# Patient Record
Sex: MALE | Race: WHITE
[De-identification: names, ages, dates, MRNs, and addresses within clinical notes are randomized per-mention and may not be internally consistent; named-entity substitution may affect disease eponyms.]

---

## 2018-04-08 ENCOUNTER — HOSPITAL ENCOUNTER (EMERGENCY)
Dept: HOSPITAL 92 - SCSER | Age: 78
Discharge: HOME | End: 2018-04-08
Payer: MEDICARE

## 2018-04-08 PROCEDURE — 96372 THER/PROPH/DIAG INJ SC/IM: CPT

## 2018-09-14 ENCOUNTER — HOSPITAL ENCOUNTER (OUTPATIENT)
Dept: HOSPITAL 92 - TBSIIMAG | Age: 78
Discharge: HOME | End: 2018-09-14
Attending: ANESTHESIOLOGY
Payer: MEDICARE

## 2018-09-14 DIAGNOSIS — M47.896: ICD-10-CM

## 2018-09-14 DIAGNOSIS — M48.062: Primary | ICD-10-CM

## 2018-09-14 DIAGNOSIS — M99.83: ICD-10-CM

## 2018-09-14 DIAGNOSIS — M47.897: ICD-10-CM

## 2018-09-14 PROCEDURE — 72148 MRI LUMBAR SPINE W/O DYE: CPT

## 2018-09-14 NOTE — MRI
MRI LUMBAR SPINE NONCONTRAST:

 

Date:  09/14/18 

 

HISTORY:  

Low back pain. Lumbar stenosis. 

 

COMPARISON:  

03/13/15. 

 

FINDINGS:

 

Conus medullaris has a normal appearance. Mild posterior disc bulge is apparent at the T11-12 level. 
Vertebral body heights are maintained. 

 

T12-L1: 

Disc space narrowing. Desiccation of the disc. Mild disc bulge. Osteophytosis. Central canal and neur
al foramina patent. 

 

L1-2: 

Disc space narrowing. Mild disc bulge. Osteophytosis. Central canal and neural foramina patent. 

 

L2-3: 

Disc space narrowing. Mild disc bulge. Osteophytosis. Fluid within the facets. Mild central canal dave
nosis. Moderate bilateral foraminal stenoses. 

 

L3-4: 

Disc space narrowing and desiccation of the disc. Focal posterior annular fissure and very small disc
 protrusion. Moderate stenosis of the central canal. Severe right and moderate left foraminal stenose
s. 

 

L4-5: 

Desiccation of the disc. Mild disc bulge. Osteophytosis. Mild central canal stenosis. Severe bilatera
l foraminal stenoses. 

 

 

L5-S1: 

Osteophytosis. Thecal sac is patent. Degenerative changes with moderate right and mild left foraminal
 stenosis. 

 

IMPRESSION: 

Multilevel degenerative changes throughout the lumbar spine as detailed above. Stenoses most greatly 
involve the neural foramina, on the right at the L3-4 level, and bilaterally at the L4-5 level. Clini
nelsy correlation regarding the right L3 dermatome and each L4 dermatome is required. 

 

 

POS: KHANH